# Patient Record
Sex: FEMALE | Race: WHITE | URBAN - METROPOLITAN AREA
[De-identification: names, ages, dates, MRNs, and addresses within clinical notes are randomized per-mention and may not be internally consistent; named-entity substitution may affect disease eponyms.]

---

## 2017-04-07 ENCOUNTER — APPOINTMENT (OUTPATIENT)
Dept: URBAN - METROPOLITAN AREA SURGERY 9 | Age: 57
Setting detail: DERMATOLOGY
End: 2017-04-07

## 2017-04-07 DIAGNOSIS — Z41.9 ENCOUNTER FOR PROCEDURE FOR PURPOSES OTHER THAN REMEDYING HEALTH STATE, UNSPECIFIED: ICD-10-CM

## 2017-04-07 DIAGNOSIS — L40.0 PSORIASIS VULGARIS: ICD-10-CM

## 2017-04-07 DIAGNOSIS — B07.8 OTHER VIRAL WARTS: ICD-10-CM

## 2017-04-07 PROCEDURE — OTHER REASSURANCE: OTHER

## 2017-04-07 PROCEDURE — OTHER COUNSELING: OTHER

## 2017-04-07 PROCEDURE — OTHER PRESCRIPTION: OTHER

## 2017-04-07 PROCEDURE — OTHER TREATMENT REGIMEN: OTHER

## 2017-04-07 PROCEDURE — 11900 INJECT SKIN LESIONS </W 7: CPT

## 2017-04-07 PROCEDURE — 99213 OFFICE O/P EST LOW 20 MIN: CPT | Mod: 25

## 2017-04-07 PROCEDURE — OTHER MEDICAL CONSULTATION: PRODUCTS: OTHER

## 2017-04-07 PROCEDURE — OTHER INTRALESIONAL KENALOG: OTHER

## 2017-04-07 RX ORDER — CLOBETASOL PROPIONATE 0.46 MG/ML
SOLUTION TOPICAL QHS
Qty: 1 | Refills: 3 | Status: ERX

## 2017-04-07 ASSESSMENT — LOCATION DETAILED DESCRIPTION DERM
LOCATION DETAILED: MID-OCCIPITAL SCALP
LOCATION DETAILED: LEFT RIB CAGE
LOCATION DETAILED: LEFT SUPERIOR PARIETAL SCALP
LOCATION DETAILED: LEFT HYPOTHENAR EMINENCE

## 2017-04-07 ASSESSMENT — LOCATION SIMPLE DESCRIPTION DERM
LOCATION SIMPLE: LEFT HAND
LOCATION SIMPLE: ABDOMEN
LOCATION SIMPLE: POSTERIOR SCALP
LOCATION SIMPLE: SCALP

## 2017-04-07 ASSESSMENT — LOCATION ZONE DERM
LOCATION ZONE: HAND
LOCATION ZONE: TRUNK
LOCATION ZONE: SCALP

## 2017-04-07 NOTE — PROCEDURE: TREATMENT REGIMEN
Discontinue Regimen: TGel shampoo
Action 2: Continue
Detail Level: Zone
Continue Regimen: Clobetasol scalp solution as directed
Otc Regimen: TSal and Aveeno Anti-dandruff shampoo; alternate shampoos throughout the week

## 2017-04-07 NOTE — PROCEDURE: INTRALESIONAL KENALOG
Consent: The risks of atrophy were reviewed with the patient.
Include Z78.9 (Other Specified Conditions Influencing Health Status) As An Associated Diagnosis?: No
X Size Of Lesion In Cm (Optional): 0
Detail Level: Detailed
Medical Necessity Clause: This procedure was medically necessary because the lesions that were treated were:
Concentration Of Solution Injected (Mg/Ml): 5.0
Kenalog Preparation: Kenalog
Total Volume Injected (Ccs- Only Use Numbers And Decimals): 1.5

## 2018-07-13 ENCOUNTER — APPOINTMENT (OUTPATIENT)
Dept: URBAN - METROPOLITAN AREA SURGERY 9 | Age: 58
Setting detail: DERMATOLOGY
End: 2018-07-13

## 2018-07-13 DIAGNOSIS — L81.4 OTHER MELANIN HYPERPIGMENTATION: ICD-10-CM

## 2018-07-13 DIAGNOSIS — Z71.89 OTHER SPECIFIED COUNSELING: ICD-10-CM

## 2018-07-13 DIAGNOSIS — D22 MELANOCYTIC NEVI: ICD-10-CM

## 2018-07-13 DIAGNOSIS — L40.0 PSORIASIS VULGARIS: ICD-10-CM

## 2018-07-13 PROBLEM — D22.5 MELANOCYTIC NEVI OF TRUNK: Status: ACTIVE | Noted: 2018-07-13

## 2018-07-13 PROCEDURE — OTHER INTRALESIONAL KENALOG: OTHER

## 2018-07-13 PROCEDURE — OTHER OTHER: OTHER

## 2018-07-13 PROCEDURE — 11900 INJECT SKIN LESIONS </W 7: CPT

## 2018-07-13 PROCEDURE — OTHER PRESCRIPTION: OTHER

## 2018-07-13 PROCEDURE — OTHER REASSURANCE: OTHER

## 2018-07-13 PROCEDURE — OTHER COUNSELING: OTHER

## 2018-07-13 PROCEDURE — OTHER TREATMENT REGIMEN: OTHER

## 2018-07-13 PROCEDURE — 99214 OFFICE O/P EST MOD 30 MIN: CPT | Mod: 25

## 2018-07-13 RX ORDER — TRIAMCINOLONE ACETONIDE 1 MG/G
CREAM TOPICAL
Qty: 1 | Refills: 2 | Status: ERX

## 2018-07-13 RX ORDER — CLOBETASOL PROPIONATE 0.46 MG/ML
SOLUTION TOPICAL QHS
Qty: 1 | Refills: 4 | Status: ERX

## 2018-07-13 ASSESSMENT — LOCATION ZONE DERM
LOCATION ZONE: SCALP
LOCATION ZONE: TRUNK

## 2018-07-13 ASSESSMENT — LOCATION DETAILED DESCRIPTION DERM
LOCATION DETAILED: EPIGASTRIC SKIN
LOCATION DETAILED: LEFT RIB CAGE
LOCATION DETAILED: LEFT INFERIOR UPPER BACK
LOCATION DETAILED: LEFT SUPERIOR PARIETAL SCALP
LOCATION DETAILED: MID-OCCIPITAL SCALP

## 2018-07-13 ASSESSMENT — LOCATION SIMPLE DESCRIPTION DERM
LOCATION SIMPLE: POSTERIOR SCALP
LOCATION SIMPLE: LEFT UPPER BACK
LOCATION SIMPLE: SCALP
LOCATION SIMPLE: ABDOMEN

## 2018-07-13 NOTE — PROCEDURE: OTHER
Detail Level: Zone
Other (Free Text): 2 month psoriasis check.  She had not been using clobetasol regular BID x 2-3 weeks.  Recommended this frequency.  We'll see how things improve with this frequency of use prn flares and then 2-3 x week as preventative maintenance.
Note Text (......Xxx Chief Complaint.): This diagnosis correlates with the

## 2018-07-13 NOTE — PROCEDURE: TREATMENT REGIMEN
Action 3: Continue
Continue Regimen: Clobetasol scalp solution as directed, advised to use twice daily for two weeks consistently for flare
Samples Given: TSal, try to use at least twice weekly
Detail Level: Zone
Start Regimen: TAC 0.1% BID x 2 weeks prn flares for the rib cage lesion

## 2018-07-13 NOTE — PROCEDURE: INTRALESIONAL KENALOG
Consent: The risks of atrophy were reviewed with the patient.
Include Z78.9 (Other Specified Conditions Influencing Health Status) As An Associated Diagnosis?: No
Detail Level: Detailed
Total Volume Injected (Ccs- Only Use Numbers And Decimals): 1.5
Kenalog Preparation: Kenalog
Administered By (Optional): NC
Medical Necessity Clause: This procedure was medically necessary because the lesions that were treated were:
Concentration Of Solution Injected (Mg/Ml): 5.0
X Size Of Lesion In Cm (Optional): 0

## 2018-09-26 ENCOUNTER — APPOINTMENT (OUTPATIENT)
Dept: URBAN - METROPOLITAN AREA SURGERY 9 | Age: 58
Setting detail: DERMATOLOGY
End: 2018-09-26

## 2018-09-26 DIAGNOSIS — Z71.89 OTHER SPECIFIED COUNSELING: ICD-10-CM

## 2018-09-26 DIAGNOSIS — L40.0 PSORIASIS VULGARIS: ICD-10-CM

## 2018-09-26 PROCEDURE — 99213 OFFICE O/P EST LOW 20 MIN: CPT

## 2018-09-26 PROCEDURE — OTHER COUNSELING: OTHER

## 2018-09-26 PROCEDURE — OTHER OTHER: OTHER

## 2018-09-26 PROCEDURE — OTHER TREATMENT REGIMEN: OTHER

## 2018-09-26 ASSESSMENT — LOCATION ZONE DERM
LOCATION ZONE: SCALP
LOCATION ZONE: TRUNK

## 2018-09-26 ASSESSMENT — LOCATION SIMPLE DESCRIPTION DERM
LOCATION SIMPLE: SCALP
LOCATION SIMPLE: POSTERIOR SCALP
LOCATION SIMPLE: ABDOMEN

## 2018-09-26 ASSESSMENT — LOCATION DETAILED DESCRIPTION DERM
LOCATION DETAILED: MID-OCCIPITAL SCALP
LOCATION DETAILED: LEFT RIB CAGE
LOCATION DETAILED: LEFT SUPERIOR PARIETAL SCALP

## 2018-09-26 NOTE — PROCEDURE: TREATMENT REGIMEN
Continue Regimen: Clobetasol scalp solution as directed, advised to use twice daily for two weeks consistently for flare

## 2018-09-26 NOTE — PROCEDURE: TREATMENT REGIMEN
Plan: Had lengthy discussion regarding otezla. Pt will call insurance to see about cost. Pt might want to begin in the new year.  Indicated that we need to look to alternatives other than topicals alone; she declined ILK injections today;  she has failed light therapy in the past, and topicals and ILK injections are not holding steady the scalp psoriasis Other Instructions: Had lengthy discussion regarding otezla. Pt will call insurance to see about cost. Pt might want to begin in the new year.  Indicated that we need to look to alternatives other than topicals alone; she declined ILK injections today;  she has failed light therapy in the past, and topicals and ILK injections are not holding steady the scalp psoriasis

## 2018-09-26 NOTE — PROCEDURE: OTHER
Note Text (......Xxx Chief Complaint.): This diagnosis correlates with the
Other (Free Text): 2 month psoriasis check.  She had not been using clobetasol regular BID x 2-3 weeks.  Recommended this frequency.  We'll see how things improve with this frequency of use prn flares and then 2-3 x week as preventative maintenance.
Detail Level: Zone
Detail Level: Detailed
Other (Free Text): Offered ILk, pt declines today.

## 2019-01-30 ENCOUNTER — RX ONLY (RX ONLY)
Age: 59
End: 2019-01-30

## 2019-01-30 RX ORDER — APREMILAST 30 MG
KIT ORAL
Qty: 1 | Refills: 0 | Status: ERX

## 2019-01-30 RX ORDER — APREMILAST 30 MG/1
TABLET, FILM COATED ORAL
Qty: 60 | Refills: 2 | Status: ERX | COMMUNITY
Start: 2019-01-30

## 2019-07-19 ENCOUNTER — APPOINTMENT (OUTPATIENT)
Dept: URBAN - METROPOLITAN AREA SURGERY 9 | Age: 59
Setting detail: DERMATOLOGY
End: 2019-07-19

## 2019-07-19 DIAGNOSIS — L81.4 OTHER MELANIN HYPERPIGMENTATION: ICD-10-CM

## 2019-07-19 DIAGNOSIS — L40.0 PSORIASIS VULGARIS: ICD-10-CM

## 2019-07-19 DIAGNOSIS — D22 MELANOCYTIC NEVI: ICD-10-CM

## 2019-07-19 DIAGNOSIS — L82.1 OTHER SEBORRHEIC KERATOSIS: ICD-10-CM

## 2019-07-19 PROBLEM — D22.5 MELANOCYTIC NEVI OF TRUNK: Status: ACTIVE | Noted: 2019-07-19

## 2019-07-19 PROCEDURE — OTHER REASSURANCE: OTHER

## 2019-07-19 PROCEDURE — OTHER PRESCRIPTION: OTHER

## 2019-07-19 PROCEDURE — OTHER COUNSELING: OTHER

## 2019-07-19 PROCEDURE — OTHER TREATMENT REGIMEN: OTHER

## 2019-07-19 PROCEDURE — 99214 OFFICE O/P EST MOD 30 MIN: CPT

## 2019-07-19 RX ORDER — APREMILAST 30 MG/1
TABLET, FILM COATED ORAL
Qty: 60 | Refills: 11 | Status: ERX | COMMUNITY
Start: 2019-07-19

## 2019-07-19 RX ORDER — CLOBETASOL PROPIONATE 0.5 MG/ML
SOLUTION TOPICAL QHS
Qty: 1 | Refills: 4 | Status: ERX | COMMUNITY
Start: 2019-07-19

## 2019-07-19 ASSESSMENT — LOCATION SIMPLE DESCRIPTION DERM
LOCATION SIMPLE: LEFT THIGH
LOCATION SIMPLE: ABDOMEN
LOCATION SIMPLE: SCALP
LOCATION SIMPLE: POSTERIOR SCALP
LOCATION SIMPLE: LEFT UPPER BACK

## 2019-07-19 ASSESSMENT — LOCATION ZONE DERM
LOCATION ZONE: SCALP
LOCATION ZONE: LEG
LOCATION ZONE: TRUNK

## 2019-07-19 ASSESSMENT — LOCATION DETAILED DESCRIPTION DERM
LOCATION DETAILED: LEFT SUPERIOR PARIETAL SCALP
LOCATION DETAILED: LEFT ANTERIOR PROXIMAL THIGH
LOCATION DETAILED: MID-OCCIPITAL SCALP
LOCATION DETAILED: EPIGASTRIC SKIN
LOCATION DETAILED: LEFT INFERIOR UPPER BACK
LOCATION DETAILED: LEFT RIB CAGE

## 2019-07-19 NOTE — PROCEDURE: TREATMENT REGIMEN
Other Instructions: Pt given [employer] phone # to contact them for continued discounted med.\\nPrior authorization approved through 06/20/20 so she should still get it for free;  call with any issues\\n\\n Plan: Pt given [employer] phone # to contact them for continued discounted med.\\nPrior authorization approved through 06/20/20 so she should still get it for free;  call with any issues\\n\\n

## 2019-07-19 NOTE — PROCEDURE: TREATMENT REGIMEN
Continue Regimen: Otezla twice daily,\\nClobetasol scalp solution twice daily for two weeks as needed flares\\n\\nRe-start both and recommended to be consistent with the oral med and consistent with clobetasol when flaring; she might have to do 2-4 cycles, 2weeks on, few days off regimen to the scalp until clear

## 2020-08-03 RX ORDER — CLOBETASOL PROPIONATE 0.5 MG/ML
SOLUTION TOPICAL QHS
Qty: 1 | Refills: 0 | Status: ERX

## 2020-09-25 ENCOUNTER — APPOINTMENT (OUTPATIENT)
Dept: URBAN - METROPOLITAN AREA SURGERY 9 | Age: 60
Setting detail: DERMATOLOGY
End: 2020-09-25

## 2020-09-25 ENCOUNTER — APPOINTMENT (OUTPATIENT)
Dept: URBAN - METROPOLITAN AREA SURGERY 9 | Age: 60
Setting detail: DERMATOLOGY
End: 2020-09-29

## 2020-09-25 DIAGNOSIS — Z41.9 ENCOUNTER FOR PROCEDURE FOR PURPOSES OTHER THAN REMEDYING HEALTH STATE, UNSPECIFIED: ICD-10-CM

## 2020-09-25 DIAGNOSIS — L57.0 ACTINIC KERATOSIS: ICD-10-CM

## 2020-09-25 DIAGNOSIS — L82.1 OTHER SEBORRHEIC KERATOSIS: ICD-10-CM

## 2020-09-25 DIAGNOSIS — L40.0 PSORIASIS VULGARIS: ICD-10-CM

## 2020-09-25 DIAGNOSIS — Z71.89 OTHER SPECIFIED COUNSELING: ICD-10-CM

## 2020-09-25 DIAGNOSIS — D22 MELANOCYTIC NEVI: ICD-10-CM

## 2020-09-25 DIAGNOSIS — L81.4 OTHER MELANIN HYPERPIGMENTATION: ICD-10-CM

## 2020-09-25 PROBLEM — D22.5 MELANOCYTIC NEVI OF TRUNK: Status: ACTIVE | Noted: 2020-09-25

## 2020-09-25 PROCEDURE — OTHER COUNSELING: OTHER

## 2020-09-25 PROCEDURE — OTHER TREATMENT REGIMEN: OTHER

## 2020-09-25 PROCEDURE — 17000 DESTRUCT PREMALG LESION: CPT

## 2020-09-25 PROCEDURE — OTHER DERMAPLANE: OTHER

## 2020-09-25 PROCEDURE — OTHER COSMETIC EXTRACTIONS: OTHER

## 2020-09-25 PROCEDURE — OTHER REASSURANCE: OTHER

## 2020-09-25 PROCEDURE — OTHER INTRALESIONAL KENALOG: OTHER

## 2020-09-25 PROCEDURE — OTHER OTHER: OTHER

## 2020-09-25 PROCEDURE — 99214 OFFICE O/P EST MOD 30 MIN: CPT | Mod: 25

## 2020-09-25 PROCEDURE — OTHER PRESCRIPTION: OTHER

## 2020-09-25 PROCEDURE — 11900 INJECT SKIN LESIONS </W 7: CPT | Mod: 59

## 2020-09-25 PROCEDURE — OTHER LIQUID NITROGEN: OTHER

## 2020-09-25 RX ORDER — CLOBETASOL PROPIONATE 0.5 MG/ML
SOLUTION TOPICAL QHS
Qty: 1 | Refills: 4 | Status: ERX

## 2020-09-25 ASSESSMENT — LOCATION ZONE DERM
LOCATION ZONE: TRUNK
LOCATION ZONE: FACE
LOCATION ZONE: SCALP
LOCATION ZONE: LEG
LOCATION ZONE: FACE

## 2020-09-25 ASSESSMENT — LOCATION SIMPLE DESCRIPTION DERM
LOCATION SIMPLE: RIGHT FOREHEAD
LOCATION SIMPLE: LEFT UPPER BACK
LOCATION SIMPLE: LEFT THIGH
LOCATION SIMPLE: RIGHT FOREHEAD
LOCATION SIMPLE: LEFT FOREHEAD
LOCATION SIMPLE: ABDOMEN
LOCATION SIMPLE: POSTERIOR SCALP
LOCATION SIMPLE: SCALP

## 2020-09-25 ASSESSMENT — LOCATION DETAILED DESCRIPTION DERM
LOCATION DETAILED: LEFT ANTERIOR PROXIMAL THIGH
LOCATION DETAILED: EPIGASTRIC SKIN
LOCATION DETAILED: LEFT RIB CAGE
LOCATION DETAILED: LEFT INFERIOR UPPER BACK
LOCATION DETAILED: LEFT FOREHEAD
LOCATION DETAILED: RIGHT INFERIOR LATERAL FOREHEAD
LOCATION DETAILED: RIGHT FOREHEAD
LOCATION DETAILED: LEFT SUPERIOR PARIETAL SCALP
LOCATION DETAILED: MID-OCCIPITAL SCALP

## 2020-09-25 NOTE — PROCEDURE: DERMAPLANE
Blade: 10 blade scalpel
Detail Level: Zone
Pre-Procedure Text: The patient was placed in a recumbant position on the procedure table.
Comments: LActic Acid, tolerance cream, spf
Treatment Areas: face
Post-Care Instructions: I reviewed with the patient in detail post-care instructions.
Treatment Area Prep: alcohol

## 2020-09-25 NOTE — PROCEDURE: COSMETIC EXTRACTIONS
Render The Number Of Extractions: Yes
Detail Level: Zone
Post-Care Instructions: I reviewed with the patient in detail post-care instructions. Patient is to wear sunprotection, and avoid picking at any of the treated lesions.
Price (Use Numbers Only, No Special Characters Or $): 80
Anesthesia Volume In Cc: 0
Consent: The patient's consent was obtained including but not limited to risks of crusting, scabbing, blistering, scarring, darker or lighter pigmentary change, recurrence, incomplete removal and infection.

## 2020-09-25 NOTE — PROCEDURE: INTRALESIONAL KENALOG
Include Z78.9 (Other Specified Conditions Influencing Health Status) As An Associated Diagnosis?: No
Kenalog Preparation: Kenalog
Administered By (Optional): NC, DO
Concentration Of Solution Injected (Mg/Ml): 10.0
Consent: The risks of atrophy were reviewed with the patient.
Total Volume Injected (Ccs- Only Use Numbers And Decimals): 2
X Size Of Lesion In Cm (Optional): 0
Detail Level: Detailed
Medical Necessity Clause: This procedure was medically necessary because the lesions that were treated were:

## 2020-09-25 NOTE — PROCEDURE: TREATMENT REGIMEN
Plan: She states she will reconsider starting Otezla in the future Other Instructions: She states she will reconsider starting Otezla in the future

## 2020-09-25 NOTE — PROCEDURE: TREATMENT REGIMEN
Continue Regimen: \\nClobetasol scalp solution twice daily for two weeks as needed flares; indicated she would need to perform 2-4 cycles of this regimen consistently to help with flare and not use infrequently while she is flaring or it will not help with her psoriasis as she would like; she verbalized understanding\\n\\nRe-start both and recommended to be consistent with the oral med and consistent with clobetasol when flaring; she might have to do 2-4 cycles, 2weeks on, few days off regimen to the scalp until clear

## 2020-09-25 NOTE — PROCEDURE: OTHER
Note Text (......Xxx Chief Complaint.): This diagnosis correlates with the
Detail Level: Detailed
Other (Free Text): TAC 0.1% BID x 2 weeks prn flares for the rib cage lesion

## 2020-11-11 ENCOUNTER — APPOINTMENT (OUTPATIENT)
Dept: URBAN - METROPOLITAN AREA SURGERY 9 | Age: 60
Setting detail: DERMATOLOGY
End: 2020-11-11

## 2020-11-11 DIAGNOSIS — L57.0 ACTINIC KERATOSIS: ICD-10-CM

## 2020-11-11 PROBLEM — D48.5 NEOPLASM OF UNCERTAIN BEHAVIOR OF SKIN: Status: ACTIVE | Noted: 2020-11-11

## 2020-11-11 PROCEDURE — OTHER LIQUID NITROGEN: OTHER

## 2020-11-11 PROCEDURE — OTHER OTHER: OTHER

## 2020-11-11 PROCEDURE — 17000 DESTRUCT PREMALG LESION: CPT | Mod: 59

## 2020-11-11 PROCEDURE — OTHER BIOPSY BY SHAVE METHOD: OTHER

## 2020-11-11 PROCEDURE — 11102 TANGNTL BX SKIN SINGLE LES: CPT

## 2020-11-11 ASSESSMENT — LOCATION SIMPLE DESCRIPTION DERM: LOCATION SIMPLE: RIGHT FOREHEAD

## 2020-11-11 ASSESSMENT — LOCATION DETAILED DESCRIPTION DERM: LOCATION DETAILED: RIGHT INFERIOR LATERAL FOREHEAD

## 2020-11-11 ASSESSMENT — LOCATION ZONE DERM: LOCATION ZONE: FACE

## 2020-11-11 NOTE — PROCEDURE: OTHER
Note Text (......Xxx Chief Complaint.): This diagnosis correlates with the
Detail Level: Detailed
Other (Free Text): subtle area still present.

## 2021-06-02 ENCOUNTER — APPOINTMENT (OUTPATIENT)
Dept: URBAN - METROPOLITAN AREA SURGERY 9 | Age: 61
Setting detail: DERMATOLOGY
End: 2021-06-02

## 2021-06-02 DIAGNOSIS — Z41.9 ENCOUNTER FOR PROCEDURE FOR PURPOSES OTHER THAN REMEDYING HEALTH STATE, UNSPECIFIED: ICD-10-CM

## 2021-06-02 DIAGNOSIS — L72.0 EPIDERMAL CYST: ICD-10-CM

## 2021-06-02 PROCEDURE — OTHER COSMETIC CONSULTATION: BOTULINUM TOXIN: OTHER

## 2021-06-02 PROCEDURE — OTHER DERMAPLANE: OTHER

## 2021-06-02 PROCEDURE — OTHER ACNE SURGERY COSMETIC: OTHER

## 2021-06-02 PROCEDURE — OTHER COSMETIC CONSULTATION: FILLERS: OTHER

## 2021-06-02 PROCEDURE — OTHER OTHER: OTHER

## 2021-06-02 ASSESSMENT — LOCATION SIMPLE DESCRIPTION DERM
LOCATION SIMPLE: LEFT FOREHEAD
LOCATION SIMPLE: LEFT CHEEK

## 2021-06-02 ASSESSMENT — LOCATION DETAILED DESCRIPTION DERM
LOCATION DETAILED: LEFT INFERIOR MEDIAL FOREHEAD
LOCATION DETAILED: LEFT SUPERIOR MEDIAL MALAR CHEEK
LOCATION DETAILED: LEFT CENTRAL MALAR CHEEK

## 2021-06-02 ASSESSMENT — LOCATION ZONE DERM
LOCATION ZONE: FACE
LOCATION ZONE: FACE

## 2021-06-02 NOTE — PROCEDURE: ACNE SURGERY COSMETIC
Hemostasis: Electrocautery
Extraction Method: 18 gauge needle and comedo extractor
Consent was obtained and risks were reviewed including but not limited to scarring, infection, bleeding, scabbing, incomplete removal, and allergy to anesthesia.
Acne Type: Comedonal Lesions
Render Post-Care Instructions In Note?: no
Prep Text (Optional): Prior to removal the treatment areas were prepped in the usual fashion.
Post-Care Instructions: I reviewed with the patient in detail post-care instructions. Patient is to keep the treatment areas dry overnight, and then apply bacitracin twice daily until healed. Patient may apply hydrogen peroxide soaks to remove any crusting.
Price (Use Numbers Only, No Special Characters Or $): 95
Render Number Of Lesions Treated: yes
Detail Level: Detailed

## 2021-06-02 NOTE — PROCEDURE: OTHER
Other (Free Text): We suggested a combination of Botox and Fillers would benefit the treatment to her upper lip lines. Voluma to cheeks could be used to improve both her cheeks and nasolabial folds.\\nShe would like to start with filler to her marionette lines and nasolabial folds. She will schedule a separate appt. for treatment
Detail Level: Simple
Render Risk Assessment In Note?: no
Note Text (......Xxx Chief Complaint.): This diagnosis correlates with the

## 2021-06-02 NOTE — PROCEDURE: DERMAPLANE
Detail Level: Zone
Pre-Procedure Text: The patient was placed in a recumbant position on the procedure table.
Blade: 10 blade scalpel
Treatment Area Prep: alcohol
Comments: Hydrate, Clear spf 46
Post-Care Instructions: I reviewed with the patient in detail post-care instructions.
Treatment Areas: face

## 2021-09-29 ENCOUNTER — APPOINTMENT (OUTPATIENT)
Dept: URBAN - METROPOLITAN AREA SURGERY 9 | Age: 61
Setting detail: DERMATOLOGY
End: 2021-09-29

## 2021-09-29 DIAGNOSIS — Z41.9 ENCOUNTER FOR PROCEDURE FOR PURPOSES OTHER THAN REMEDYING HEALTH STATE, UNSPECIFIED: ICD-10-CM

## 2021-09-29 PROCEDURE — OTHER DERMAPLANE: OTHER

## 2021-09-29 ASSESSMENT — LOCATION ZONE DERM: LOCATION ZONE: FACE

## 2021-09-29 ASSESSMENT — LOCATION SIMPLE DESCRIPTION DERM: LOCATION SIMPLE: LEFT FOREHEAD

## 2021-09-29 ASSESSMENT — LOCATION DETAILED DESCRIPTION DERM: LOCATION DETAILED: LEFT MEDIAL FOREHEAD

## 2021-09-29 NOTE — PROCEDURE: DERMAPLANE
Detail Level: Zone
Blade: 10 blade scalpel
Treatment Area Prep: alcohol
Treatment Areas: face
Post-Care Instructions: I reviewed with the patient in detail post-care instructions.
Comments: 1 pass of illuminize, milia removal, trio, soothing complex
Pre-Procedure Text: The patient was placed in a recumbant position on the procedure table.

## 2021-11-18 ENCOUNTER — APPOINTMENT (OUTPATIENT)
Dept: URBAN - METROPOLITAN AREA SURGERY 9 | Age: 61
Setting detail: DERMATOLOGY
End: 2021-11-18

## 2021-11-18 DIAGNOSIS — Z41.9 ENCOUNTER FOR PROCEDURE FOR PURPOSES OTHER THAN REMEDYING HEALTH STATE, UNSPECIFIED: ICD-10-CM

## 2021-11-18 PROCEDURE — OTHER DERMAPLANE: OTHER

## 2021-11-18 ASSESSMENT — LOCATION SIMPLE DESCRIPTION DERM: LOCATION SIMPLE: RIGHT FOREHEAD

## 2021-11-18 ASSESSMENT — LOCATION ZONE DERM: LOCATION ZONE: FACE

## 2021-11-18 ASSESSMENT — LOCATION DETAILED DESCRIPTION DERM: LOCATION DETAILED: RIGHT MEDIAL FOREHEAD

## 2021-11-18 NOTE — PROCEDURE: DERMAPLANE
Comments: Coconut enzyme, trio with modicum, spf daily tint
Treatment Area Prep: alcohol
Blade: 10 blade scalpel
Detail Level: Zone
Post-Care Instructions: I reviewed with the patient in detail post-care instructions.
Pre-Procedure Text: The patient was placed in a recumbant position on the procedure table.
Treatment Areas: face

## 2022-03-16 ENCOUNTER — APPOINTMENT (OUTPATIENT)
Dept: URBAN - METROPOLITAN AREA SURGERY 9 | Age: 62
Setting detail: DERMATOLOGY
End: 2022-03-31

## 2022-03-16 DIAGNOSIS — Z41.9 ENCOUNTER FOR PROCEDURE FOR PURPOSES OTHER THAN REMEDYING HEALTH STATE, UNSPECIFIED: ICD-10-CM

## 2022-03-16 DIAGNOSIS — L81.4 OTHER MELANIN HYPERPIGMENTATION: ICD-10-CM

## 2022-03-16 DIAGNOSIS — D22 MELANOCYTIC NEVI: ICD-10-CM

## 2022-03-16 DIAGNOSIS — L40.0 PSORIASIS VULGARIS: ICD-10-CM

## 2022-03-16 DIAGNOSIS — L82.1 OTHER SEBORRHEIC KERATOSIS: ICD-10-CM

## 2022-03-16 PROBLEM — D22.5 MELANOCYTIC NEVI OF TRUNK: Status: ACTIVE | Noted: 2022-03-16

## 2022-03-16 PROCEDURE — OTHER COUNSELING: OTHER

## 2022-03-16 PROCEDURE — OTHER PRESCRIPTION MEDICATION MANAGEMENT: OTHER

## 2022-03-16 PROCEDURE — OTHER COSMETIC CONSULTATION: FILLERS: OTHER

## 2022-03-16 PROCEDURE — OTHER REASSURANCE: OTHER

## 2022-03-16 PROCEDURE — OTHER OTHER: OTHER

## 2022-03-16 PROCEDURE — 99213 OFFICE O/P EST LOW 20 MIN: CPT

## 2022-03-16 PROCEDURE — OTHER PRESCRIPTION: OTHER

## 2022-03-16 PROCEDURE — OTHER SUNSCREEN RECOMMENDATIONS: OTHER

## 2022-03-16 RX ORDER — CLOBETASOL PROPIONATE 0.5 MG/ML
SOLUTION TOPICAL
Qty: 50 | Refills: 2 | Status: ERX | COMMUNITY
Start: 2022-03-16

## 2022-03-16 ASSESSMENT — LOCATION DETAILED DESCRIPTION DERM
LOCATION DETAILED: LEFT ANTERIOR PROXIMAL THIGH
LOCATION DETAILED: MID-OCCIPITAL SCALP
LOCATION DETAILED: LEFT INFERIOR MEDIAL MALAR CHEEK
LOCATION DETAILED: LEFT RIB CAGE
LOCATION DETAILED: LEFT INFERIOR UPPER BACK
LOCATION DETAILED: EPIGASTRIC SKIN
LOCATION DETAILED: LEFT SUPERIOR PARIETAL SCALP

## 2022-03-16 ASSESSMENT — LOCATION ZONE DERM
LOCATION ZONE: FACE
LOCATION ZONE: LEG
LOCATION ZONE: SCALP
LOCATION ZONE: TRUNK

## 2022-03-16 ASSESSMENT — LOCATION SIMPLE DESCRIPTION DERM
LOCATION SIMPLE: LEFT THIGH
LOCATION SIMPLE: POSTERIOR SCALP
LOCATION SIMPLE: LEFT CHEEK
LOCATION SIMPLE: LEFT UPPER BACK
LOCATION SIMPLE: SCALP
LOCATION SIMPLE: ABDOMEN

## 2022-03-16 NOTE — PROCEDURE: OTHER
Note Text (......Xxx Chief Complaint.): This diagnosis correlates with the
Detail Level: Detailed
Other (Free Text): TAC 0.1% BID x 2 weeks prn flares for the rib cage lesion
Other (Free Text): Plan to use Voluma or Countor filler to the cheeks, and filler to the nasolabial folds and marionette lines. Did not review cost information. \\nMary will follow up for filler appointment.
Render Risk Assessment In Note?: no

## 2022-03-16 NOTE — PROCEDURE: PRESCRIPTION MEDICATION MANAGEMENT
Render In Strict Bullet Format?: No
Continue Regimen: Clobetasol scalp solution twice daily for two weeks as needed for flares
Detail Level: Zone

## 2022-04-18 ENCOUNTER — APPOINTMENT (OUTPATIENT)
Dept: URBAN - METROPOLITAN AREA SURGERY 9 | Age: 62
Setting detail: DERMATOLOGY
End: 2022-04-18

## 2022-04-18 DIAGNOSIS — Z41.9 ENCOUNTER FOR PROCEDURE FOR PURPOSES OTHER THAN REMEDYING HEALTH STATE, UNSPECIFIED: ICD-10-CM

## 2022-04-18 PROCEDURE — OTHER INVENTORY: OTHER

## 2022-04-18 PROCEDURE — OTHER DERMAPLANE: OTHER

## 2022-04-18 ASSESSMENT — LOCATION ZONE DERM: LOCATION ZONE: FACE

## 2022-04-18 ASSESSMENT — LOCATION SIMPLE DESCRIPTION DERM: LOCATION SIMPLE: RIGHT CHEEK

## 2022-04-18 ASSESSMENT — LOCATION DETAILED DESCRIPTION DERM: LOCATION DETAILED: RIGHT SUPERIOR CENTRAL MALAR CHEEK

## 2022-04-18 NOTE — PROCEDURE: DERMAPLANE
Pre-Procedure Text: The patient was placed in a recumbant position on the procedure table.
Treatment Areas: face
Detail Level: Zone
Post-Care Instructions: I reviewed with the patient in detail post-care instructions.
Comments: lemon rejuv enzyme, trio mod, soothing 25
Treatment Area Prep: alcohol
Blade: 10 blade scalpel

## 2022-06-09 ENCOUNTER — APPOINTMENT (OUTPATIENT)
Dept: URBAN - METROPOLITAN AREA SURGERY 9 | Age: 62
Setting detail: DERMATOLOGY
End: 2022-06-09

## 2022-06-09 DIAGNOSIS — Z41.9 ENCOUNTER FOR PROCEDURE FOR PURPOSES OTHER THAN REMEDYING HEALTH STATE, UNSPECIFIED: ICD-10-CM

## 2022-06-09 PROCEDURE — OTHER DERMAPLANE: OTHER

## 2022-06-09 ASSESSMENT — LOCATION ZONE DERM: LOCATION ZONE: FACE

## 2022-06-09 ASSESSMENT — LOCATION DETAILED DESCRIPTION DERM: LOCATION DETAILED: LEFT FOREHEAD

## 2022-06-09 ASSESSMENT — LOCATION SIMPLE DESCRIPTION DERM: LOCATION SIMPLE: LEFT FOREHEAD

## 2022-06-09 NOTE — PROCEDURE: DERMAPLANE
Pre-Procedure Text: The patient was placed in a recumbant position on the procedure table.
Detail Level: Zone
Blade: 10 blade scalpel
Comments: coconut enzyme- 3 min plus rejuv/calming mask for 10 min, trio mod, spf
Post-Care Instructions: I reviewed with the patient in detail post-care instructions.
Treatment Area Prep: alcohol
Price (Use Numbers Only, No Special Characters Or $): 89
Treatment Areas: face

## 2022-09-12 ENCOUNTER — APPOINTMENT (OUTPATIENT)
Dept: URBAN - METROPOLITAN AREA SURGERY 9 | Age: 62
Setting detail: DERMATOLOGY
End: 2022-09-13

## 2022-09-12 DIAGNOSIS — Z41.9 ENCOUNTER FOR PROCEDURE FOR PURPOSES OTHER THAN REMEDYING HEALTH STATE, UNSPECIFIED: ICD-10-CM

## 2022-09-12 PROCEDURE — OTHER DERMAPLANE: OTHER

## 2022-09-12 ASSESSMENT — LOCATION DETAILED DESCRIPTION DERM: LOCATION DETAILED: RIGHT MEDIAL FOREHEAD

## 2022-09-12 ASSESSMENT — LOCATION ZONE DERM: LOCATION ZONE: FACE

## 2022-09-12 ASSESSMENT — LOCATION SIMPLE DESCRIPTION DERM: LOCATION SIMPLE: RIGHT FOREHEAD

## 2022-09-12 NOTE — PROCEDURE: DERMAPLANE
Post-Care Instructions: I reviewed with the patient in detail post-care instructions.
Price (Use Numbers Only, No Special Characters Or $): 95
Detail Level: Zone
Blade: 10 blade scalpel
Treatment Area Prep: alcohol
Treatment Areas: face
Pre-Procedure Text: The patient was placed in a recumbant position on the procedure table.

## 2022-12-05 ENCOUNTER — APPOINTMENT (OUTPATIENT)
Dept: URBAN - METROPOLITAN AREA SURGERY 9 | Age: 62
Setting detail: DERMATOLOGY
End: 2022-12-06

## 2022-12-05 DIAGNOSIS — Z41.9 ENCOUNTER FOR PROCEDURE FOR PURPOSES OTHER THAN REMEDYING HEALTH STATE, UNSPECIFIED: ICD-10-CM

## 2022-12-05 PROCEDURE — OTHER INVENTORY: OTHER

## 2022-12-05 PROCEDURE — OTHER DERMAPLANE: OTHER

## 2022-12-05 ASSESSMENT — LOCATION DETAILED DESCRIPTION DERM: LOCATION DETAILED: RIGHT FOREHEAD

## 2022-12-05 ASSESSMENT — LOCATION ZONE DERM: LOCATION ZONE: FACE

## 2022-12-05 ASSESSMENT — LOCATION SIMPLE DESCRIPTION DERM: LOCATION SIMPLE: RIGHT FOREHEAD

## 2022-12-05 NOTE — PROCEDURE: DERMAPLANE
Blade: 10 blade scalpel
Post-Care Instructions: I reviewed with the patient in detail post-care instructions.
Treatment Area Prep: alcohol
Treatment Areas: face
Pre-Procedure Text: The patient was placed in a recumbant position on the procedure table.
Price (Use Numbers Only, No Special Characters Or $): 95
Detail Level: Zone

## 2023-02-08 ENCOUNTER — APPOINTMENT (OUTPATIENT)
Dept: URBAN - METROPOLITAN AREA SURGERY 9 | Age: 63
Setting detail: DERMATOLOGY
End: 2023-02-15

## 2023-02-08 DIAGNOSIS — Z41.9 ENCOUNTER FOR PROCEDURE FOR PURPOSES OTHER THAN REMEDYING HEALTH STATE, UNSPECIFIED: ICD-10-CM

## 2023-02-08 PROCEDURE — OTHER CHEMICAL PEEL: OTHER

## 2023-02-08 PROCEDURE — OTHER DERMAPLANE: OTHER

## 2023-02-08 ASSESSMENT — LOCATION ZONE DERM: LOCATION ZONE: FACE

## 2023-02-08 ASSESSMENT — LOCATION SIMPLE DESCRIPTION DERM
LOCATION SIMPLE: RIGHT FOREHEAD
LOCATION SIMPLE: SUPERIOR FOREHEAD

## 2023-02-08 ASSESSMENT — LOCATION DETAILED DESCRIPTION DERM
LOCATION DETAILED: SUPERIOR MID FOREHEAD
LOCATION DETAILED: RIGHT FOREHEAD

## 2023-02-08 NOTE — PROCEDURE: DERMAPLANE
Blade: 10 blade scalpel
Detail Level: Zone
Price (Use Numbers Only, No Special Characters Or $): 25
Treatment Areas: face
Treatment Area Prep: alcohol
Pre-Procedure Text: The patient was placed in a recumbant position on the procedure table.
Post-Care Instructions: I reviewed with the patient in detail post-care instructions.

## 2023-02-08 NOTE — PROCEDURE: CHEMICAL PEEL
Price (Use Numbers Only, No Special Characters Or $): 100
Chemical Peel: Skin Medica Illuminize
Detail Level: Zone
Treatment Number: 0
Post-Care Instructions: I reviewed with the patient in detail post-care instructions. Patient should avoid sun exposure and wear sun protection.
Number Of Layers: 3
Treatment Time (Optional): 20 min
Consent: Prior to the procedure, written consent was obtained and risks were reviewed, including but not limited to: redness, peeling, blistering, pigmentary change, scarring, infection, and pain.
Post Peel Care: After the procedure, post-care instructions were reviewed with the patient. Pt tolerated well.
Prep: The treated area was degreased with pre-peel cleanser, and vaseline was applied for protection of mucous membranes.

## 2023-03-17 ENCOUNTER — APPOINTMENT (OUTPATIENT)
Dept: URBAN - METROPOLITAN AREA SURGERY 9 | Age: 63
Setting detail: DERMATOLOGY
End: 2023-03-17

## 2023-03-17 DIAGNOSIS — L40.0 PSORIASIS VULGARIS: ICD-10-CM

## 2023-03-17 DIAGNOSIS — L81.4 OTHER MELANIN HYPERPIGMENTATION: ICD-10-CM

## 2023-03-17 DIAGNOSIS — L82.1 OTHER SEBORRHEIC KERATOSIS: ICD-10-CM

## 2023-03-17 DIAGNOSIS — D22 MELANOCYTIC NEVI: ICD-10-CM

## 2023-03-17 PROBLEM — D22.5 MELANOCYTIC NEVI OF TRUNK: Status: ACTIVE | Noted: 2023-03-17

## 2023-03-17 PROCEDURE — OTHER SUNSCREEN RECOMMENDATIONS: OTHER

## 2023-03-17 PROCEDURE — OTHER REASSURANCE: OTHER

## 2023-03-17 PROCEDURE — 99213 OFFICE O/P EST LOW 20 MIN: CPT

## 2023-03-17 PROCEDURE — OTHER PRESCRIPTION MEDICATION MANAGEMENT: OTHER

## 2023-03-17 PROCEDURE — OTHER PRESCRIPTION: OTHER

## 2023-03-17 PROCEDURE — OTHER COUNSELING: OTHER

## 2023-03-17 RX ORDER — CLOBETASOL PROPIONATE 0.5 MG/ML
SOLUTION TOPICAL
Qty: 50 | Refills: 2 | Status: ERX

## 2023-03-17 ASSESSMENT — LOCATION SIMPLE DESCRIPTION DERM
LOCATION SIMPLE: LEFT THIGH
LOCATION SIMPLE: SCALP
LOCATION SIMPLE: LEFT UPPER BACK
LOCATION SIMPLE: POSTERIOR SCALP
LOCATION SIMPLE: ABDOMEN

## 2023-03-17 ASSESSMENT — LOCATION ZONE DERM
LOCATION ZONE: TRUNK
LOCATION ZONE: LEG
LOCATION ZONE: SCALP

## 2023-03-17 ASSESSMENT — LOCATION DETAILED DESCRIPTION DERM
LOCATION DETAILED: EPIGASTRIC SKIN
LOCATION DETAILED: LEFT SUPERIOR PARIETAL SCALP
LOCATION DETAILED: LEFT ANTERIOR PROXIMAL THIGH
LOCATION DETAILED: MID-OCCIPITAL SCALP
LOCATION DETAILED: LEFT INFERIOR UPPER BACK

## 2023-03-17 NOTE — PROCEDURE: PRESCRIPTION MEDICATION MANAGEMENT
Plan: Patient may call for TAC cream if needed.
Render In Strict Bullet Format?: No
Continue Regimen: Clobetasol scalp solution twice daily for two weeks as needed for flares
Detail Level: Zone

## 2023-03-28 ENCOUNTER — APPOINTMENT (OUTPATIENT)
Dept: URBAN - METROPOLITAN AREA SURGERY 9 | Age: 63
Setting detail: DERMATOLOGY
End: 2023-03-28

## 2023-03-28 DIAGNOSIS — Z41.9 ENCOUNTER FOR PROCEDURE FOR PURPOSES OTHER THAN REMEDYING HEALTH STATE, UNSPECIFIED: ICD-10-CM

## 2023-03-28 PROCEDURE — OTHER CHEMICAL PEEL: OTHER

## 2023-03-28 PROCEDURE — OTHER DERMAPLANE: OTHER

## 2023-03-28 ASSESSMENT — LOCATION SIMPLE DESCRIPTION DERM: LOCATION SIMPLE: RIGHT FOREHEAD

## 2023-03-28 ASSESSMENT — LOCATION DETAILED DESCRIPTION DERM
LOCATION DETAILED: RIGHT FOREHEAD
LOCATION DETAILED: RIGHT SUPERIOR MEDIAL FOREHEAD

## 2023-03-28 ASSESSMENT — LOCATION ZONE DERM: LOCATION ZONE: FACE

## 2023-03-28 NOTE — PROCEDURE: DERMAPLANE
Pre-Procedure Text: The patient was placed in a recumbant position on the procedure table.
Detail Level: Zone
Price (Use Numbers Only, No Special Characters Or $): 25
Post-Care Instructions: I reviewed with the patient in detail post-care instructions.
Treatment Area Prep: alcohol
Blade: 10 blade scalpel
Treatment Areas: face

## 2023-03-28 NOTE — PROCEDURE: CHEMICAL PEEL
Detail Level: Zone
Post Peel Care: After the procedure, post-care instructions were reviewed with the patient. Pt tolerated well.
Prep: The treated area was degreased with pre-peel cleanser, and vaseline was applied for protection of mucous membranes.
Consent: Prior to the procedure, written consent was obtained and risks were reviewed, including but not limited to: redness, peeling, blistering, pigmentary change, scarring, infection, and pain.
Treatment Time (Optional): 20 min
Number Of Layers: 3
Post-Care Instructions: I reviewed with the patient in detail post-care instructions. Patient should avoid sun exposure and wear sun protection.
Treatment Number: 0
Price (Use Numbers Only, No Special Characters Or $): 150
Chemical Peel: Skin Medica Vitalize

## 2023-12-19 RX ORDER — CLOBETASOL PROPIONATE 0.5 MG/ML
SOLUTION TOPICAL
Qty: 50 | Refills: 1 | Status: ERX

## 2024-03-19 ENCOUNTER — APPOINTMENT (OUTPATIENT)
Dept: URBAN - METROPOLITAN AREA SURGERY 9 | Age: 64
Setting detail: DERMATOLOGY
End: 2024-03-19

## 2024-03-19 DIAGNOSIS — L82.0 INFLAMED SEBORRHEIC KERATOSIS: ICD-10-CM

## 2024-03-19 DIAGNOSIS — D22 MELANOCYTIC NEVI: ICD-10-CM

## 2024-03-19 DIAGNOSIS — L57.0 ACTINIC KERATOSIS: ICD-10-CM

## 2024-03-19 DIAGNOSIS — L40.0 PSORIASIS VULGARIS: ICD-10-CM

## 2024-03-19 DIAGNOSIS — L57.8 OTHER SKIN CHANGES DUE TO CHRONIC EXPOSURE TO NONIONIZING RADIATION: ICD-10-CM

## 2024-03-19 DIAGNOSIS — L82.1 OTHER SEBORRHEIC KERATOSIS: ICD-10-CM

## 2024-03-19 DIAGNOSIS — L81.4 OTHER MELANIN HYPERPIGMENTATION: ICD-10-CM

## 2024-03-19 PROBLEM — D22.5 MELANOCYTIC NEVI OF TRUNK: Status: ACTIVE | Noted: 2024-03-19

## 2024-03-19 PROCEDURE — OTHER OTHER: OTHER

## 2024-03-19 PROCEDURE — OTHER PRESCRIPTION MEDICATION MANAGEMENT: OTHER

## 2024-03-19 PROCEDURE — OTHER MIPS QUALITY: OTHER

## 2024-03-19 PROCEDURE — OTHER REASSURANCE: OTHER

## 2024-03-19 PROCEDURE — OTHER LIQUID NITROGEN: OTHER

## 2024-03-19 PROCEDURE — 17000 DESTRUCT PREMALG LESION: CPT | Mod: 59

## 2024-03-19 PROCEDURE — OTHER COUNSELING: OTHER

## 2024-03-19 PROCEDURE — OTHER SUNSCREEN RECOMMENDATIONS: OTHER

## 2024-03-19 PROCEDURE — 17110 DESTRUCT B9 LESION 1-14: CPT

## 2024-03-19 PROCEDURE — 99213 OFFICE O/P EST LOW 20 MIN: CPT | Mod: 25

## 2024-03-19 ASSESSMENT — LOCATION SIMPLE DESCRIPTION DERM
LOCATION SIMPLE: RIGHT NOSE
LOCATION SIMPLE: LEFT UPPER BACK
LOCATION SIMPLE: LEFT FOREHEAD
LOCATION SIMPLE: POSTERIOR SCALP
LOCATION SIMPLE: LEFT THIGH
LOCATION SIMPLE: SCALP
LOCATION SIMPLE: ABDOMEN

## 2024-03-19 ASSESSMENT — LOCATION DETAILED DESCRIPTION DERM
LOCATION DETAILED: LEFT MEDIAL FOREHEAD
LOCATION DETAILED: MID-OCCIPITAL SCALP
LOCATION DETAILED: LEFT SUPERIOR PARIETAL SCALP
LOCATION DETAILED: LEFT ANTERIOR PROXIMAL THIGH
LOCATION DETAILED: RIGHT NASAL SIDEWALL
LOCATION DETAILED: LEFT INFERIOR UPPER BACK
LOCATION DETAILED: EPIGASTRIC SKIN

## 2024-03-19 ASSESSMENT — LOCATION ZONE DERM
LOCATION ZONE: LEG
LOCATION ZONE: SCALP
LOCATION ZONE: NOSE
LOCATION ZONE: TRUNK
LOCATION ZONE: FACE

## 2024-03-19 NOTE — PROCEDURE: OTHER
Render Risk Assessment In Note?: no
Other (Free Text): Q-tip LN2 performed
Note Text (......Xxx Chief Complaint.): This diagnosis correlates with the
Detail Level: Zone

## 2024-03-19 NOTE — PROCEDURE: COUNSELING
Detail Level: Zone
Detail Level: Generalized
Patient Specific Counseling (Will Not Stick From Patient To Patient): We discussed a series of mini peels as she does not want much down time (potential 3 total spaced 2 weeks apart) to help lighten and brighten her skin prior to May end wedding of her son's in [place].  Her friend, Dr. Vazquez, also discussed BBL laser therapy as an option.  Indicated I would reach out to our , Kimmie, to see what she has to offer and recommendations.  Time is of essence since wedding is about 2 months away.

## 2024-03-19 NOTE — HPI: EVALUATION OF SKIN LESION(S)
Hpi Title: Evaluation of Skin Lesions
Additional History: Patient has lesion that bleeds on forehead of concern and hasn't used anything to treat.

## 2024-03-19 NOTE — PROCEDURE: LIQUID NITROGEN
Show Applicator Variable?: Yes
Detail Level: Detailed
Number Of Freeze-Thaw Cycles: 1 freeze-thaw cycle
Render Post-Care Instructions In Note?: no
Duration Of Freeze Thaw-Cycle (Seconds): 15
Post-Care Instructions: I reviewed with the patient in detail post-care instructions. Patient is to wear sunprotection, and avoid picking at any of the treated lesions. Pt may apply Vaseline to crusted or scabbing areas.
Consent: The patient's consent was obtained including but not limited to risks of crusting, scabbing, blistering, scarring, darker or lighter pigmentary change, recurrence, incomplete removal and infection.
Spray Paint Text: The liquid nitrogen was applied to the skin utilizing a spray paint frosting technique.
Medical Necessity Clause: This procedure was medically necessary because the lesions that were treated were:
Medical Necessity Information: It is in your best interest to select a reason for this procedure from the list below. All of these items fulfill various CMS LCD requirements except the new and changing color options.
Number Of Freeze-Thaw Cycles: 2 freeze-thaw cycles

## 2024-03-19 NOTE — PROCEDURE: PRESCRIPTION MEDICATION MANAGEMENT
Plan: Patient may call for TAC cream if needed.\\n\\nShe will call for any RFs if and when needed
Render In Strict Bullet Format?: No
Continue Regimen: Clobetasol scalp solution twice daily for two weeks as needed for flares
Detail Level: Zone

## 2025-03-19 ENCOUNTER — APPOINTMENT (OUTPATIENT)
Dept: URBAN - METROPOLITAN AREA SURGERY 9 | Age: 65
Setting detail: DERMATOLOGY
End: 2025-03-20

## 2025-03-19 DIAGNOSIS — L81.4 OTHER MELANIN HYPERPIGMENTATION: ICD-10-CM

## 2025-03-19 DIAGNOSIS — L40.0 PSORIASIS VULGARIS: ICD-10-CM

## 2025-03-19 DIAGNOSIS — L82.1 OTHER SEBORRHEIC KERATOSIS: ICD-10-CM

## 2025-03-19 DIAGNOSIS — D22 MELANOCYTIC NEVI: ICD-10-CM

## 2025-03-19 DIAGNOSIS — L57.8 OTHER SKIN CHANGES DUE TO CHRONIC EXPOSURE TO NONIONIZING RADIATION: ICD-10-CM

## 2025-03-19 PROBLEM — D22.5 MELANOCYTIC NEVI OF TRUNK: Status: ACTIVE | Noted: 2025-03-19

## 2025-03-19 PROCEDURE — OTHER SUNSCREEN RECOMMENDATIONS: OTHER

## 2025-03-19 PROCEDURE — OTHER REASSURANCE: OTHER

## 2025-03-19 PROCEDURE — OTHER PRESCRIPTION MEDICATION MANAGEMENT: OTHER

## 2025-03-19 PROCEDURE — OTHER PRESCRIPTION: OTHER

## 2025-03-19 PROCEDURE — OTHER MIPS QUALITY: OTHER

## 2025-03-19 PROCEDURE — OTHER COUNSELING: OTHER

## 2025-03-19 PROCEDURE — 99213 OFFICE O/P EST LOW 20 MIN: CPT

## 2025-03-19 PROCEDURE — OTHER SKIN MEDICINALS: OTHER

## 2025-03-19 RX ORDER — CLOBETASOL PROPIONATE 0.5 MG/ML
SOLUTION TOPICAL
Qty: 50 | Refills: 4 | Status: ERX

## 2025-03-19 ASSESSMENT — LOCATION DETAILED DESCRIPTION DERM
LOCATION DETAILED: MID-OCCIPITAL SCALP
LOCATION DETAILED: LEFT INFERIOR UPPER BACK
LOCATION DETAILED: LEFT ANTERIOR PROXIMAL THIGH
LOCATION DETAILED: LEFT INFERIOR CENTRAL MALAR CHEEK
LOCATION DETAILED: LEFT SUPERIOR PARIETAL SCALP
LOCATION DETAILED: EPIGASTRIC SKIN

## 2025-03-19 ASSESSMENT — LOCATION SIMPLE DESCRIPTION DERM
LOCATION SIMPLE: LEFT UPPER BACK
LOCATION SIMPLE: ABDOMEN
LOCATION SIMPLE: POSTERIOR SCALP
LOCATION SIMPLE: SCALP
LOCATION SIMPLE: LEFT THIGH
LOCATION SIMPLE: LEFT CHEEK

## 2025-03-19 ASSESSMENT — LOCATION ZONE DERM
LOCATION ZONE: FACE
LOCATION ZONE: TRUNK
LOCATION ZONE: LEG
LOCATION ZONE: SCALP

## 2025-03-19 NOTE — PROCEDURE: COUNSELING
Detail Level: Zone
Detail Level: Generalized
Patient Specific Counseling (Will Not Stick From Patient To Patient): She is interested in microneedling.  Will discuss with other providers as to who they recommend for treatment since we no longer offer it here.

## 2025-03-19 NOTE — PROCEDURE: SKIN MEDICINALS
Sig: Take one pill daily
Sig: Wash affected areas daily.
Sig: Apply twice daily for 5 days
Sig: Take one twice daily
Sig: Apply to the affected skin twice daily
Sig: Apply a thin layer to the affected nails daily
Sig: Apply to affected areas twice daily
Sig: Apply pea sized amount per area at night
Sig: Apply a thin layer to the affected areas daily
Sig: Apply nightly to warts nightly under occlusion
Sig: Apply a thin layer to the affected areas twice daily
Sig: Apply to affected areas on face twice daily
Sig: Take one pill twice daily
Sig: Apply a thin layer to the affected skin twice daily
Product Type (1): Lightening Cream
Sig: Apply a thin layer to the areas with decreased hair density 1-2 times daily
Sig: Apply to AAs qhs as tolerated x 8 weeks.  Take 1 month off.  Repeat for another cycle if needed.
Sig: Apply a thin layer to the itching areas twice daily as needed
Sig: Use as directed when washing hair
Sig: Apply a thin layer to the scar daily
Detail Level: Zone
Lightening Cream Prescription 1: Hydroquinone 4.5%, Tretinoin 0.025%, Fluocinolone 0.01%, Niacinamide 4% Cream
Intro Statement: I recommended the following products:

## 2025-03-19 NOTE — PROCEDURE: PRESCRIPTION MEDICATION MANAGEMENT
Plan: Patient may call for TAC cream if needed.
Render In Strict Bullet Format?: No
Continue Regimen: Clobetasol scalp solution twice daily for two weeks as needed for flares
Detail Level: Zone
Plan: She will call in the fall time period for Rx to use late fall/winter time period.